# Patient Record
Sex: FEMALE | Race: WHITE | NOT HISPANIC OR LATINO
[De-identification: names, ages, dates, MRNs, and addresses within clinical notes are randomized per-mention and may not be internally consistent; named-entity substitution may affect disease eponyms.]

---

## 2020-08-21 ENCOUNTER — APPOINTMENT (OUTPATIENT)
Dept: OBGYN | Facility: CLINIC | Age: 45
End: 2020-08-21

## 2021-05-25 ENCOUNTER — APPOINTMENT (OUTPATIENT)
Dept: ANTEPARTUM | Facility: CLINIC | Age: 46
End: 2021-05-25
Payer: COMMERCIAL

## 2021-05-25 ENCOUNTER — APPOINTMENT (OUTPATIENT)
Dept: OBGYN | Facility: CLINIC | Age: 46
End: 2021-05-25
Payer: COMMERCIAL

## 2021-05-25 PROCEDURE — 58100 BIOPSY OF UTERUS LINING: CPT

## 2021-05-25 PROCEDURE — 99386 PREV VISIT NEW AGE 40-64: CPT

## 2021-05-25 PROCEDURE — 76830 TRANSVAGINAL US NON-OB: CPT

## 2021-05-25 PROCEDURE — 76857 US EXAM PELVIC LIMITED: CPT

## 2021-06-30 PROBLEM — Z00.00 ENCOUNTER FOR PREVENTIVE HEALTH EXAMINATION: Status: ACTIVE | Noted: 2021-06-30

## 2021-07-21 ENCOUNTER — EMERGENCY (EMERGENCY)
Facility: HOSPITAL | Age: 46
LOS: 1 days | Discharge: HOME | End: 2021-07-21
Attending: EMERGENCY MEDICINE
Payer: COMMERCIAL

## 2021-07-21 VITALS
TEMPERATURE: 98 F | WEIGHT: 175.05 LBS | RESPIRATION RATE: 16 BRPM | HEART RATE: 98 BPM | DIASTOLIC BLOOD PRESSURE: 84 MMHG | HEIGHT: 62 IN | SYSTOLIC BLOOD PRESSURE: 148 MMHG | OXYGEN SATURATION: 98 %

## 2021-07-21 LAB
HCG UR QL: NEGATIVE — SIGNIFICANT CHANGE UP
SARS-COV-2 RNA SPEC QL NAA+PROBE: SIGNIFICANT CHANGE UP

## 2021-07-21 PROCEDURE — 99284 EMERGENCY DEPT VISIT MOD MDM: CPT

## 2021-07-21 PROCEDURE — 87635 SARS-COV-2 COVID-19 AMP PRB: CPT

## 2021-07-21 PROCEDURE — 99283 EMERGENCY DEPT VISIT LOW MDM: CPT | Mod: 25

## 2021-07-21 PROCEDURE — 71045 X-RAY EXAM CHEST 1 VIEW: CPT

## 2021-07-21 PROCEDURE — 71045 X-RAY EXAM CHEST 1 VIEW: CPT | Mod: 26

## 2021-07-21 PROCEDURE — 81025 URINE PREGNANCY TEST: CPT

## 2021-07-21 NOTE — ED PROVIDER NOTE - PATIENT PORTAL LINK FT
You can access the FollowMyHealth Patient Portal offered by [facility] by registering at the following website: [email or web address]. By joining [employer]’s FollowMyHealth portal, you will also be able to view your health information using other applications (apps) compatible with our system.

## 2021-07-21 NOTE — ED PROVIDER NOTE - OBJECTIVE STATEMENT
Hong Konger : [id number]  45 y/o F patient with a significant PMHx of HTN, HLD, asthma presents to the ED with c/o cough x several days. Patient reports requiring albuterol inhaler cause of the shortness of breath. Patient states she went to her PMD and was told to schedule out patient CXR to r/o PNA and other infection. Patient reports that she decided to come in to the ED cause it would be easier to come to the ER and then making an appointment outpatient. No fever, chills, nausea and vomiting, or any other complaints. No shortness of breath right now. No sick contacts.

## 2021-07-21 NOTE — ED PROVIDER NOTE - CLINICAL SUMMARY MEDICAL DECISION MAKING FREE TEXT BOX
Pt w/ aforementioned presentation found to have negative CXR and negative covid. Pt w/ no wheezing or respiratory distress on exam. symptoms likely triggered by virus or current poor air quality advisory due [place]. Pt counseled and dc home

## 2021-07-21 NOTE — ED PROVIDER NOTE - NSFOLLOWUPINSTRUCTIONS_ED_ALL_ED_FT
Cough, Adult      A cough helps to clear your throat and lungs. A cough may be a sign of an illness or another medical condition.    An acute cough may only last 2–3 weeks, while a chronic cough may last 8 or more weeks.  Many things can cause a cough. They include:  •Germs (viruses or bacteria) that attack the airway.      •Breathing in things that bother (irritate) your lungs.      •Allergies.      •Asthma.      •Mucus that runs down the back of your throat (postnasal drip).      •Smoking.      •Acid backing up from the stomach into the tube that moves food from the mouth to the stomach (gastroesophageal reflux).      •Some medicines.      •Lung problems.      •Other medical conditions, such as heart failure or a blood clot in the lung (pulmonary embolism).        Follow these instructions at home:    Medicines     •Take over-the-counter and prescription medicines only as told by your doctor.      •Talk with your doctor before you take medicines that stop a cough (cough suppressants).        Lifestyle      • Do not smoke, and try not to be around smoke. Do not use any products that contain nicotine or tobacco, such as cigarettes, e-cigarettes, and chewing tobacco. If you need help quitting, ask your doctor.      •Drink enough fluid to keep your pee (urine) pale yellow.      •Avoid caffeine.      • Do not drink alcohol if your doctor tells you not to drink.        General instructions      •Watch for any changes in your cough. Tell your doctor about them.      •Always cover your mouth when you cough.      •Stay away from things that make you cough, such as perfume, candles, campfire smoke, or cleaning products.      •If the air is dry, use a cool mist vaporizer or humidifier in your home.      •If your cough is worse at night, try using extra pillows to raise your head up higher while you sleep.      •Rest as needed.      •Keep all follow-up visits as told by your doctor. This is important.        Contact a doctor if:    •You have new symptoms.      •You cough up pus.      •Your cough does not get better after 2–3 weeks, or your cough gets worse.      •Cough medicine does not help your cough and you are not sleeping well.      •You have pain that gets worse or pain that is not helped with medicine.      •You have a fever.      •You are losing weight and you do not know why.      •You have night sweats.        Get help right away if:    •You cough up blood.      •You have trouble breathing.      •Your heartbeat is very fast.      These symptoms may be an emergency. Do not wait to see if the symptoms will go away. Get medical help right away. Call your local emergency services (911 in the [place] Do not drive yourself to the hospital.       Summary    •A cough helps to clear your throat and lungs. Many things can cause a cough.      •Take over-the-counter and prescription medicines only as told by your doctor.      •Always cover your mouth when you cough.      •Contact a doctor if you have new symptoms or you have a cough that does not get better or gets worse.      This information is not intended to replace advice given to you by your health care provider. Make sure you discuss any questions you have with your health care provider.      ?????? ? ????????    Cough, Adult      ?????? — ??? ???????, ??????? ??????? ???? ????? ? ??????????? ???? (??????????? ???????). ?????? ???????????? ?????????????? ?????? ? ???????? ??. ????????????? ???????????? — ??? ?????????, ?? ???? ?????? ?????????? ? ??????? ?????????? ??? ?????? ?????, ?? ?? ????? ???? ????????? ???????????, ??????? ?????????? ??????. ?????? ?????? ????? ??????? ?????? 2–3 ??????, ? ??????????? — 8 ?????? ??? ??????.  ??????? ????????? ????? ????????:  •????????????? ??????????? ??????????????? ??? ??????????.      •???????? ???????, ??????? ?????????? ???? ??????.      •????????.      •???????????? ?????.      •?????, ??????? ??????? ?? ?????? ?????? ?????? ????? (?????????? ?? ?????? ?????? ??????).      •???????.      •?????? ??????? ?? ??????? ? ??????? (??????????????????? ???????).      •????????? ????????????? ?????????.      •??????????? ????????? ?? ??????? ??????.      •?????? ???????????, ????? ??? ????????? ??????????????? ??? ??????? ????? ? ?????? (????????????? ???????? ???????).        ? ???????? ???????? ???????? ????? ????????????:    ????????????? ?????????     •?????????? ?????????????? ? ??????????? ????????????? ????????? ?????? ??? ??????? ????? ??????? ??????.      •????? ??? ??? ????????? ????????????? ???????? ?? ?????, ??????????????????? ? ????? ??????? ??????.        ????? ?????      •????????? ??????????? ????. ?? ???????????? ??????, ??? ???????? ??????? ??? ?????, ???????? ????????, ??????????? ???????? ? ??????????? ?????. ???? ??? ?????? ?? ???? ???????? ??? ????? ??????, ?????????? ? ?????? ???????? ?????.      •????? ?????????? ????????, ????? ???? ???? ?????????? ??????-??????? ?????.      •????????? ???????.      • ?? ????? ????????, ???? ??? ??????? ???? ???? ??? ????? ????????.        ????? ????????      •???????? ?????? ???????? ?? ????????? ? ?????. ????????? ? ??? ?????? ???????? ?????.      •?? ????? ????? ?????? ??????????? ???.      •????????? ????????, ??????? ?????????? ??? ???????, ????? ??? ????, ?????, ???????? ????????, ??? ?????? ??? ???????? ???.      •???? ?????? ?????, ?????????? ???????? ????????? ???? ??? ??????????? ??????? ? ????? ??????? ??? ? ???? ??? ??????????????? ?????????? ??????????? ???????.      •???? ????? ??? ?????? ???????????, ?????????? ????? ? ????-???????????? ?????????.      •????????? ?? ???? ?????????????.      •????????? ?? ??? ?????? ???????????? ?????????? ??? ??????? ????? ??????? ??????. ??? ?????.        ?????????? ? ???????? ?????, ???? ? ???:    •????????? ????? ????????.      •????????????? ????.      •?????? ?? ?????????? ????? ????? 2–3 ?????? ??? ???????????.      •?????? ?? ??????? ????? ?????? ??????????????? ????????????? ?????????? ? ??? ???????? ???.      •????, ??????? ????????????, ??? ????, ??????? ?? ??????? ????? ?????? ?????????????? ?????????.      •????????? ?????????.      •??????????? ???????????? ???????? ????.      •????????? ?????? ??????????.        ?????????? ?????????? ?? ???????, ????:    •?? ???????????? ????? ??? ?????.      •??? ?????? ??????.      •???????????? ?????? ??????????.      ??? ???????? ????? ????????? ?? ????????? ?????????, ????????? ???????? ?????????? ??????. ?? ?????, ???? ???????? ??????? ????. ?????????? ?????????? ?? ??????????? ???????. ????????? ? ??????? ?????? ?????????? ?????? (? ??? ?? ?????? 911). ?? ??????? ?????????????? ????? ? ???????? ?? ??????????.       ??????    •?????? — ??? ???????, ??????? ??????? ???? ????? ? ??????????? ????. ????????????? ???????????? — ??? ?????????, ?? ???? ?????? ?????????? ? ??????? ?????????? ??? ?????? ?????, ?? ?? ????? ???? ????????? ???????????, ??????? ?????????? ??????.      •?????????? ?????????????? ? ??????????? ????????????? ????????? ?????? ??? ??????? ????? ??????? ??????.      •?? ????? ????? ?????? ??????????? ???.      •?????????? ? ???????? ?????, ???? ? ??? ????????? ????? ???????? ??? ???? ?????? ?? ???????? ????? 2–3 ?????? ??? ???????????.      ??? ?????????? ?? ????? ???????? ??????, ??????????????? ????? ??????. ??????????? ???????? ????? ???????????? ??? ??????? ? ????? ??????? ??????.

## 2021-07-21 NOTE — ED ADULT NURSE NOTE - NSIMPLEMENTINTERV_GEN_ALL_ED
Implemented All Universal Safety Interventions:  [place] to call system. Call bell, personal items and telephone within reach. Instruct patient to call for assistance. Room bathroom lighting operational. Non-slip footwear when patient is off stretcher. Physically safe environment: no spills, clutter or unnecessary equipment. Stretcher in lowest position, wheels locked, appropriate side rails in place.

## 2021-07-21 NOTE — ED PROVIDER NOTE - PHYSICAL EXAMINATION
General: pt lying in stretcher, appears stated age and is not in distress  HEENT: AT/NC, pink conjunctiva, anicteric sclerae, EOMI, PERRLA, TMs smooth, grey, intact, with normal landmarks, nasal mucosa pink, no discharge, turbinates not enlarged; moist mucus membranes, tongue well-papillated, good dentition; posterior pharynx shows no erythema or exudates;   Neck: supple, full ROM, trachea midline, no JVD, no cervical LAD, no midline ttp or stepoffs  Lungs: symmetric excursion, b/l clear vesicular breath sounds with no wheezes, crackles, or rhonchi  Heart: rrr, S1, S2 normal; no S3 or S4; no murmurs or rubs  Abd: normal bowel sounds; soft, nontender; negative McBurney's point tenderness, negative Gardiner's sign, no rebound, no guarding, spleen non-palpable; no hepatomegaly, no masses  Back: no midline spinal tenderness or stepoffs, no costovertebral angle tenderness  Extremities: no clubbing, cyanosis, or edema; no palpable deformities or fractures  Skin: good turgor; no rashes, petechiae, ecchymoses, or jaundice  Pulses: radial, posterior tibialis (PT), dorsalis pedis (DP) all 2+ & symmetric  Neuro: awake, alert, responsive; oriented to person, place and time; cranial nerves intact, EOMI, intact jaw movement, intact facial sensation, no facial asymmetry, hearing intact; no nystagmus, tongue midline; Motor: Normal tone in upper and lower extremities bilaterally strength 5/5; Sensory: intact to pinprick and light touch; Cerebellar: finger-to-nose intact; normal steady gait; negative Romberg’s sign; DTR: biceps, triceps, patellar, 2+, no pronator drift General: pt lying in stretcher, appears stated age and is not in distress  HEENT: AT/NC, pink conjunctiva, anicteric sclerae, EOMI, mmm  Neck: supple, full ROM, trachea midline, no JVD, no cervical LAD, no midline ttp or stepoffs  Lungs: symmetric excursion, b/l clear vesicular breath sounds with no wheezes, crackles, or rhonchi  Heart: rrr, S1, S2 normal; no S3 or S4; no murmurs or rubs  Abd: normal bowel sounds; soft, nontender; negative McBurney's point tenderness, negative Gardiner's sign, no rebound, no guarding, spleen non-palpable; no hepatomegaly, no masses  Back: no midline spinal tenderness or stepoffs, no costovertebral angle tenderness  Extremities: no clubbing, cyanosis, or edema; no palpable deformities or fractures  Skin: good turgor; no rashes, petechiae, ecchymoses, or jaundice  Pulses: radial, posterior tibialis (PT), dorsalis pedis (DP) all 2+ & symmetric  Neuro: awake, alert, responsive; oriented to person, place and time; cranial nerves intact, EOMI, intact jaw movement, intact facial sensation, no facial asymmetry, hearing intact; Motor: Normal tone in upper and lower extremities bilaterally; Sensory: intact ; normal steady gait;

## 2021-10-14 ENCOUNTER — APPOINTMENT (OUTPATIENT)
Dept: ANTEPARTUM | Facility: CLINIC | Age: 46
End: 2021-10-14

## 2021-10-14 PROBLEM — E78.5 HYPERLIPIDEMIA, UNSPECIFIED: Chronic | Status: ACTIVE | Noted: 2021-07-21

## 2021-10-14 PROBLEM — I10 ESSENTIAL (PRIMARY) HYPERTENSION: Chronic | Status: ACTIVE | Noted: 2021-07-21

## 2021-10-14 PROBLEM — J45.909 UNSPECIFIED ASTHMA, UNCOMPLICATED: Chronic | Status: ACTIVE | Noted: 2021-07-21

## 2021-11-18 ENCOUNTER — APPOINTMENT (OUTPATIENT)
Dept: ANTEPARTUM | Facility: CLINIC | Age: 46
End: 2021-11-18
Payer: COMMERCIAL

## 2021-11-18 ENCOUNTER — APPOINTMENT (OUTPATIENT)
Dept: OBGYN | Facility: CLINIC | Age: 46
End: 2021-11-18
Payer: COMMERCIAL

## 2021-11-18 VITALS — SYSTOLIC BLOOD PRESSURE: 126 MMHG | DIASTOLIC BLOOD PRESSURE: 83 MMHG | WEIGHT: 177 LBS

## 2021-11-18 DIAGNOSIS — N92.0 EXCESSIVE AND FREQUENT MENSTRUATION WITH REGULAR CYCLE: ICD-10-CM

## 2021-11-18 PROCEDURE — 76376 3D RENDER W/INTRP POSTPROCES: CPT

## 2021-11-18 PROCEDURE — 76856 US EXAM PELVIC COMPLETE: CPT

## 2021-11-18 PROCEDURE — 76830 TRANSVAGINAL US NON-OB: CPT

## 2021-11-19 LAB
BASOPHILS # BLD AUTO: 0.04 K/UL
BASOPHILS NFR BLD AUTO: 0.5 %
EOSINOPHIL # BLD AUTO: 0.15 K/UL
EOSINOPHIL NFR BLD AUTO: 1.9 %
HCT VFR BLD CALC: 41.3 %
HGB BLD-MCNC: 13.1 G/DL
IMM GRANULOCYTES NFR BLD AUTO: 0.3 %
LYMPHOCYTES # BLD AUTO: 2.62 K/UL
LYMPHOCYTES NFR BLD AUTO: 33.2 %
MAN DIFF?: NORMAL
MCHC RBC-ENTMCNC: 28.7 PG
MCHC RBC-ENTMCNC: 31.7 GM/DL
MCV RBC AUTO: 90.4 FL
MONOCYTES # BLD AUTO: 0.44 K/UL
MONOCYTES NFR BLD AUTO: 5.6 %
NEUTROPHILS # BLD AUTO: 4.61 K/UL
NEUTROPHILS NFR BLD AUTO: 58.5 %
PLATELET # BLD AUTO: 279 K/UL
RBC # BLD: 4.57 M/UL
RBC # FLD: 13.2 %
WBC # FLD AUTO: 7.88 K/UL

## 2022-12-08 ENCOUNTER — EMERGENCY (EMERGENCY)
Facility: HOSPITAL | Age: 47
LOS: 1 days | Discharge: HOME | End: 2022-12-08
Attending: EMERGENCY MEDICINE
Payer: MEDICAID

## 2022-12-08 VITALS
OXYGEN SATURATION: 98 % | DIASTOLIC BLOOD PRESSURE: 86 MMHG | SYSTOLIC BLOOD PRESSURE: 132 MMHG | WEIGHT: 167.55 LBS | HEART RATE: 115 BPM | TEMPERATURE: 101 F | RESPIRATION RATE: 17 BRPM | HEIGHT: 66 IN

## 2022-12-08 LAB
APPEARANCE UR: CLEAR — SIGNIFICANT CHANGE UP
BILIRUB UR-MCNC: NEGATIVE — SIGNIFICANT CHANGE UP
COLOR SPEC: SIGNIFICANT CHANGE UP
DIFF PNL FLD: ABNORMAL
GAS PNL BLDV: SIGNIFICANT CHANGE UP
GLUCOSE UR QL: NEGATIVE — SIGNIFICANT CHANGE UP
HCT VFR BLD CALC: 38.9 % — SIGNIFICANT CHANGE UP (ref 34.5–45)
HGB BLD-MCNC: 12.8 G/DL — SIGNIFICANT CHANGE UP (ref 11.5–15.5)
KETONES UR-MCNC: ABNORMAL
LEUKOCYTE ESTERASE UR-ACNC: NEGATIVE — SIGNIFICANT CHANGE UP
MCHC RBC-ENTMCNC: 27.5 PG — SIGNIFICANT CHANGE UP (ref 27–34)
MCHC RBC-ENTMCNC: 32.9 GM/DL — SIGNIFICANT CHANGE UP (ref 32–36)
MCV RBC AUTO: 83.7 FL — SIGNIFICANT CHANGE UP (ref 80–100)
NITRITE UR-MCNC: NEGATIVE — SIGNIFICANT CHANGE UP
PH UR: 6.5 — SIGNIFICANT CHANGE UP (ref 5–8)
PLATELET # BLD AUTO: 266 K/UL — SIGNIFICANT CHANGE UP (ref 150–400)
PROT UR-MCNC: ABNORMAL
RBC # BLD: 4.65 M/UL — SIGNIFICANT CHANGE UP (ref 3.8–5.2)
RBC # FLD: 13.1 % — SIGNIFICANT CHANGE UP (ref 10.3–14.5)
SP GR SPEC: 1.02 — SIGNIFICANT CHANGE UP (ref 1.01–1.02)
UROBILINOGEN FLD QL: NEGATIVE — SIGNIFICANT CHANGE UP
WBC # BLD: 6.85 K/UL — SIGNIFICANT CHANGE UP (ref 3.8–10.5)
WBC # FLD AUTO: 6.85 K/UL — SIGNIFICANT CHANGE UP (ref 3.8–10.5)

## 2022-12-08 PROCEDURE — 93010 ELECTROCARDIOGRAM REPORT: CPT

## 2022-12-08 PROCEDURE — 99285 EMERGENCY DEPT VISIT HI MDM: CPT

## 2022-12-08 RX ORDER — SODIUM CHLORIDE 9 MG/ML
1000 INJECTION INTRAMUSCULAR; INTRAVENOUS; SUBCUTANEOUS ONCE
Refills: 0 | Status: COMPLETED | OUTPATIENT
Start: 2022-12-08 | End: 2022-12-08

## 2022-12-08 RX ORDER — ONDANSETRON 8 MG/1
4 TABLET, FILM COATED ORAL ONCE
Refills: 0 | Status: COMPLETED | OUTPATIENT
Start: 2022-12-08 | End: 2022-12-08

## 2022-12-08 RX ORDER — ACETAMINOPHEN 500 MG
1000 TABLET ORAL ONCE
Refills: 0 | Status: COMPLETED | OUTPATIENT
Start: 2022-12-08 | End: 2022-12-08

## 2022-12-08 RX ADMIN — SODIUM CHLORIDE 1000 MILLILITER(S): 9 INJECTION INTRAMUSCULAR; INTRAVENOUS; SUBCUTANEOUS at 23:32

## 2022-12-08 RX ADMIN — Medication 400 MILLIGRAM(S): at 23:32

## 2022-12-08 RX ADMIN — ONDANSETRON 4 MILLIGRAM(S): 8 TABLET, FILM COATED ORAL at 23:32

## 2022-12-08 NOTE — ED PROVIDER NOTE - OBJECTIVE STATEMENT
48yo F with no PMH presents for eval of CP/abd pain & fever. Pt states she's been feeling bad for last 2-3 weeks but in last 5d started with a cough than developed fever, nausea/vomiting and worsening epigastric pain radiating up along posterior sternum and mild SOB. Went to [facility] where she and her son state they 'didn't really do anything' and dc'd her from ED. Patient feels worsenign abd pain that she feels is radiating to 'liver'. Not really tolerating PO, no urinary sxs, no rashes. CP is worse w/ movement and leaning foward. Not on meds, last travel to [facility], no known sick contacts. Fully vaccinated.

## 2022-12-08 NOTE — ED ADULT TRIAGE NOTE - CHIEF COMPLAINT QUOTE
pt c/o difficulty breathing, cp x 2 days; pt also endorsing n/v and fevers x 4 days; pt reports h/o asthma and reports no relief with inhaler

## 2022-12-08 NOTE — ED PROVIDER NOTE - PROGRESS NOTE DETAILS
Jona PGY2: +influenza, pt feels mildly improved but not totally resolved. Feels better after IVF. Discussed results of labs and CTS with family. Will dc min.e     Return precautions given with [facility]  [id number] [facility] - pt states she understands.  Has PCP appt at 9am 12/9. Jona PGY2: planned for dc, pt was able to tolerate PO water. However nearing dc, pt began vomiting again. Will repeat zofran and a 2nd L of IVF and hold dispo. Re-PO challenge before dispo. Attending note (Jude): results reviewed with ED resident; agree with above plan, if tolerating po, will be stable for dc. Jona PGY2: tolerated PO w/o vomiting. Dc home with family.

## 2022-12-08 NOTE — ED PROVIDER NOTE - PATIENT PORTAL LINK FT
You can access the FollowMyHealth Patient Portal offered by [facility] by registering at the following website: [email or web address]. By joining [employer]’s FollowMyHealth portal, you will also be able to view your health information using other applications (apps) compatible with our system. You can access the FollowMyHealth Patient Portal offered by [facility] by registering at the following website: [email or web address]. By joining [employer]’s FollowMyHealth portal, you will also be able to view your health information using other applications (apps) compatible with our system.

## 2022-12-08 NOTE — ED PROVIDER NOTE - CLINICAL SUMMARY MEDICAL DECISION MAKING FREE TEXT BOX
Jona PGY2: 48yo F with no PMH presents for 5d of SOB, cough, fever, abd pain with NV. May represent viral synd vs gall bladder disease vs PNA vs COVID-19 vs less likely PE but cannot r/o. EKG no acute ischemic changes. Will plan for labs, CT chest/abd/pelvis to assess for PE and pathology. Culture, RVP, UA for infectious w/u. Control fever, nausea & hydrate.

## 2022-12-08 NOTE — ED PROVIDER NOTE - SHIFT CHANGE DETAILS
Carlos Eduardo Ceja MD [facility] note of transfer at the usual time of sign out: Receiving team will follow up on labs, analgesia, any clinical imaging, reassess and disposition as clinically indicated.  Details of patient and plan conveyed to receiving physician and conveyed back for understanding.  There were no questions at this time about the patient's status, disposition, and plan. Patient's care to be taken over by receiving physician at this time, all decisions regarding the progression of care will be made at their discretion.

## 2022-12-08 NOTE — ED PROVIDER NOTE - ATTENDING CONTRIBUTION TO CARE
47 no past medical history, feeling unwell x 3 weeks and 5 days of cough followed by n/v/ and fevers with intermittent shortness of breath and epigastric pain. Worse sitting up and forward. patient presented to outside hospital without gross pathological findings. now here with persistent pain in the abdomen and right upper quadrant discomfort. + nauseated and febrile  + travel to [facility]   will get iv, cbc, cmp, analgesia and antiemetic prn, cta chest if and follow through to abdomen and pelvis  Will follow up on labs, analgesia, imaging, reassess and disposition as clinically indicated.  *The above represents an initial assessment/impression. Please refer to my progress notes below for potential changes in patient clinical course*

## 2022-12-08 NOTE — ED PROVIDER NOTE - NSFOLLOWUPINSTRUCTIONS_ED_ALL_ED_FT
Influenza / Flu    - Hydrate well as tolerated  - Rest   - Control fever with Tylenol or Motrin as needed  - Return if your symptoms worsen, unable to eat or drink, or other new symptoms appear.    - Follow up with primary care doctor  --------------------------------------------------------------------------------------------------------------    ????? / ?????    - ?????????? ??????, ??? ???????????  - ????????  - ????????????? ????????? ?????????? ??? ???????? ?? ???? ?????????????.  - ?????????, ???? ???? ???????? ?????????, ?? ?? ??????? ???? ??? ???? ??? ???????? ?????? ????? ????????.    - ??????????? ?????????? ? ????? ????????? ??????-?????????? ?????? Influenza / Flu    - Hydrate well as tolerated  - Rest   - Control fever with Tylenol or Motrin as needed  - Return if your symptoms worsen, unable to eat or drink, or other new symptoms appear.    - Follow up with primary care doctor

## 2022-12-08 NOTE — ED ADULT NURSE NOTE - OBJECTIVE STATEMENT
47y F with PMHx of presents to the ED for cp and sob x 4days. Pt is Indian speaking, son at the bedside reports pt began coughing about 4 days ago, and cp developed after, pt feels worse cp when she coughs, went to [facility] yesterday and was discharged and does not know diagnosis, feels like "they were not helpful." Pt is able to speak in full sentences on RA. On CM, Sinus tach. Pt safety and comfort measures provided.

## 2022-12-08 NOTE — ED PROVIDER NOTE - PHYSICAL EXAMINATION
CONSTITUTIONAL: fatigued, mild-mod distress, tearful  SKIN: dry  HEAD: NCAT  EYES: NL inspection, no icterus   ENT: dry oral mucosa  NECK: Supple; non tender.  CARD: tachycardia, regular, tender chest wall muscles   RESP: lungs clear w/o crackles/rhonchi  ABD: generally tender but inc in RUQ/epigastric region  EXT: no pedal edema; some inc R calf swelling w/o erythema   NEURO: Grossly unremarkable  PSYCH: tearful

## 2022-12-09 VITALS
RESPIRATION RATE: 17 BRPM | DIASTOLIC BLOOD PRESSURE: 70 MMHG | SYSTOLIC BLOOD PRESSURE: 112 MMHG | HEART RATE: 102 BPM | OXYGEN SATURATION: 98 %

## 2022-12-09 LAB
ALBUMIN SERPL ELPH-MCNC: 4.1 G/DL — SIGNIFICANT CHANGE UP (ref 3.3–5)
ALP SERPL-CCNC: 64 U/L — SIGNIFICANT CHANGE UP (ref 40–120)
ALT FLD-CCNC: 12 U/L — SIGNIFICANT CHANGE UP (ref 10–45)
ANION GAP SERPL CALC-SCNC: 16 MMOL/L — SIGNIFICANT CHANGE UP (ref 5–17)
APTT BLD: 30.3 SEC — SIGNIFICANT CHANGE UP (ref 27.5–35.5)
AST SERPL-CCNC: 14 U/L — SIGNIFICANT CHANGE UP (ref 10–40)
BASOPHILS # BLD AUTO: 0 K/UL — SIGNIFICANT CHANGE UP (ref 0–0.2)
BASOPHILS NFR BLD AUTO: 0 % — SIGNIFICANT CHANGE UP (ref 0–2)
BILIRUB SERPL-MCNC: 0.3 MG/DL — SIGNIFICANT CHANGE UP (ref 0.2–1.2)
BUN SERPL-MCNC: 11 MG/DL — SIGNIFICANT CHANGE UP (ref 7–23)
CALCIUM SERPL-MCNC: 8.9 MG/DL — SIGNIFICANT CHANGE UP (ref 8.4–10.5)
CHLORIDE SERPL-SCNC: 101 MMOL/L — SIGNIFICANT CHANGE UP (ref 96–108)
CO2 SERPL-SCNC: 19 MMOL/L — LOW (ref 22–31)
CREAT SERPL-MCNC: 0.61 MG/DL — SIGNIFICANT CHANGE UP (ref 0.5–1.3)
EGFR: 112 ML/MIN/1.73M2 — SIGNIFICANT CHANGE UP
EOSINOPHIL # BLD AUTO: 0.06 K/UL — SIGNIFICANT CHANGE UP (ref 0–0.5)
EOSINOPHIL NFR BLD AUTO: 0.9 % — SIGNIFICANT CHANGE UP (ref 0–6)
FLUAV H1 2009 PAND RNA SPEC QL NAA+PROBE: DETECTED
GLUCOSE SERPL-MCNC: 106 MG/DL — HIGH (ref 70–99)
INR BLD: 1.28 RATIO — HIGH (ref 0.88–1.16)
LIDOCAIN IGE QN: 54 U/L — SIGNIFICANT CHANGE UP (ref 7–60)
LYMPHOCYTES # BLD AUTO: 0.48 K/UL — LOW (ref 1–3.3)
LYMPHOCYTES # BLD AUTO: 7 % — LOW (ref 13–44)
MAGNESIUM SERPL-MCNC: 1.7 MG/DL — SIGNIFICANT CHANGE UP (ref 1.6–2.6)
MONOCYTES # BLD AUTO: 0.18 K/UL — SIGNIFICANT CHANGE UP (ref 0–0.9)
MONOCYTES NFR BLD AUTO: 2.6 % — SIGNIFICANT CHANGE UP (ref 2–14)
NEUTROPHILS # BLD AUTO: 6.13 K/UL — SIGNIFICANT CHANGE UP (ref 1.8–7.4)
NEUTROPHILS NFR BLD AUTO: 84.3 % — HIGH (ref 43–77)
NT-PROBNP SERPL-SCNC: 83 PG/ML — SIGNIFICANT CHANGE UP (ref 0–300)
POTASSIUM SERPL-MCNC: 3.5 MMOL/L — SIGNIFICANT CHANGE UP (ref 3.5–5.3)
POTASSIUM SERPL-SCNC: 3.5 MMOL/L — SIGNIFICANT CHANGE UP (ref 3.5–5.3)
PROT SERPL-MCNC: 7.9 G/DL — SIGNIFICANT CHANGE UP (ref 6–8.3)
PROTHROM AB SERPL-ACNC: 14.8 SEC — HIGH (ref 10.5–13.4)
RAPID RVP RESULT: DETECTED
SARS-COV-2 RNA SPEC QL NAA+PROBE: SIGNIFICANT CHANGE UP
SODIUM SERPL-SCNC: 136 MMOL/L — SIGNIFICANT CHANGE UP (ref 135–145)
TROPONIN T, HIGH SENSITIVITY RESULT: <6 NG/L — SIGNIFICANT CHANGE UP (ref 0–51)

## 2022-12-09 PROCEDURE — 84132 ASSAY OF SERUM POTASSIUM: CPT

## 2022-12-09 PROCEDURE — 84295 ASSAY OF SERUM SODIUM: CPT

## 2022-12-09 PROCEDURE — 80053 COMPREHEN METABOLIC PANEL: CPT

## 2022-12-09 PROCEDURE — 74174 CTA ABD&PLVS W/CONTRAST: CPT | Mod: MA

## 2022-12-09 PROCEDURE — 36415 COLL VENOUS BLD VENIPUNCTURE: CPT

## 2022-12-09 PROCEDURE — 96375 TX/PRO/DX INJ NEW DRUG ADDON: CPT | Mod: XU

## 2022-12-09 PROCEDURE — 0225U NFCT DS DNA&RNA 21 SARSCOV2: CPT

## 2022-12-09 PROCEDURE — 87086 URINE CULTURE/COLONY COUNT: CPT

## 2022-12-09 PROCEDURE — 82947 ASSAY GLUCOSE BLOOD QUANT: CPT

## 2022-12-09 PROCEDURE — 82565 ASSAY OF CREATININE: CPT

## 2022-12-09 PROCEDURE — 87040 BLOOD CULTURE FOR BACTERIA: CPT

## 2022-12-09 PROCEDURE — 71275 CT ANGIOGRAPHY CHEST: CPT | Mod: MA

## 2022-12-09 PROCEDURE — 83735 ASSAY OF MAGNESIUM: CPT

## 2022-12-09 PROCEDURE — 93005 ELECTROCARDIOGRAM TRACING: CPT

## 2022-12-09 PROCEDURE — 82435 ASSAY OF BLOOD CHLORIDE: CPT

## 2022-12-09 PROCEDURE — 96376 TX/PRO/DX INJ SAME DRUG ADON: CPT | Mod: XU

## 2022-12-09 PROCEDURE — 84484 ASSAY OF TROPONIN QUANT: CPT

## 2022-12-09 PROCEDURE — 81001 URINALYSIS AUTO W/SCOPE: CPT

## 2022-12-09 PROCEDURE — 85730 THROMBOPLASTIN TIME PARTIAL: CPT

## 2022-12-09 PROCEDURE — 85610 PROTHROMBIN TIME: CPT

## 2022-12-09 PROCEDURE — 82330 ASSAY OF CALCIUM: CPT

## 2022-12-09 PROCEDURE — 85025 COMPLETE CBC W/AUTO DIFF WBC: CPT

## 2022-12-09 PROCEDURE — 83690 ASSAY OF LIPASE: CPT

## 2022-12-09 PROCEDURE — 83605 ASSAY OF LACTIC ACID: CPT

## 2022-12-09 PROCEDURE — 82803 BLOOD GASES ANY COMBINATION: CPT

## 2022-12-09 PROCEDURE — 83880 ASSAY OF NATRIURETIC PEPTIDE: CPT

## 2022-12-09 PROCEDURE — 96374 THER/PROPH/DIAG INJ IV PUSH: CPT | Mod: XU

## 2022-12-09 PROCEDURE — 74174 CTA ABD&PLVS W/CONTRAST: CPT | Mod: 26,MA

## 2022-12-09 PROCEDURE — 85014 HEMATOCRIT: CPT

## 2022-12-09 PROCEDURE — 99285 EMERGENCY DEPT VISIT HI MDM: CPT | Mod: 25

## 2022-12-09 PROCEDURE — 71275 CT ANGIOGRAPHY CHEST: CPT | Mod: 26,MA

## 2022-12-09 PROCEDURE — 85018 HEMOGLOBIN: CPT

## 2022-12-09 RX ORDER — SODIUM CHLORIDE 9 MG/ML
1000 INJECTION INTRAMUSCULAR; INTRAVENOUS; SUBCUTANEOUS ONCE
Refills: 0 | Status: COMPLETED | OUTPATIENT
Start: 2022-12-09 | End: 2022-12-09

## 2022-12-09 RX ORDER — ONDANSETRON 8 MG/1
4 TABLET, FILM COATED ORAL ONCE
Refills: 0 | Status: COMPLETED | OUTPATIENT
Start: 2022-12-09 | End: 2022-12-09

## 2022-12-09 RX ADMIN — ONDANSETRON 4 MILLIGRAM(S): 8 TABLET, FILM COATED ORAL at 03:53

## 2022-12-09 RX ADMIN — SODIUM CHLORIDE 1000 MILLILITER(S): 9 INJECTION INTRAMUSCULAR; INTRAVENOUS; SUBCUTANEOUS at 03:53

## 2022-12-09 RX ADMIN — Medication 1000 MILLIGRAM(S): at 00:30

## 2022-12-09 NOTE — ED ADULT NURSE REASSESSMENT NOTE - NS ED NURSE REASSESS COMMENT FT1
Pt given food for PO challenge. Verbalized understanding to notify RN if she experiences N/V or abdominal pain.

## 2022-12-10 LAB
CULTURE RESULTS: SIGNIFICANT CHANGE UP
SPECIMEN SOURCE: SIGNIFICANT CHANGE UP

## 2022-12-14 LAB
CULTURE RESULTS: SIGNIFICANT CHANGE UP
SPECIMEN SOURCE: SIGNIFICANT CHANGE UP

## 2024-05-22 ENCOUNTER — EMERGENCY (EMERGENCY)
Facility: HOSPITAL | Age: 49
LOS: 1 days | Discharge: HOME | End: 2024-05-22
Attending: EMERGENCY MEDICINE
Payer: COMMERCIAL

## 2024-05-22 PROCEDURE — 99285 EMERGENCY DEPT VISIT HI MDM: CPT

## 2024-05-23 VITALS
TEMPERATURE: 98 F | OXYGEN SATURATION: 98 % | DIASTOLIC BLOOD PRESSURE: 78 MMHG | SYSTOLIC BLOOD PRESSURE: 116 MMHG | RESPIRATION RATE: 17 BRPM | HEART RATE: 80 BPM | WEIGHT: 175.05 LBS

## 2024-05-23 VITALS
TEMPERATURE: 98 F | SYSTOLIC BLOOD PRESSURE: 110 MMHG | HEART RATE: 64 BPM | DIASTOLIC BLOOD PRESSURE: 71 MMHG | OXYGEN SATURATION: 98 % | RESPIRATION RATE: 16 BRPM

## 2024-05-23 PROBLEM — Z78.9 OTHER SPECIFIED HEALTH STATUS: Chronic | Status: ACTIVE | Noted: 2022-12-09

## 2024-05-23 LAB
ALBUMIN SERPL ELPH-MCNC: 3.6 G/DL — SIGNIFICANT CHANGE UP (ref 3.5–5)
ALP SERPL-CCNC: 50 U/L — SIGNIFICANT CHANGE UP (ref 40–120)
ALT FLD-CCNC: 19 U/L DA — SIGNIFICANT CHANGE UP (ref 10–60)
ANION GAP SERPL CALC-SCNC: 3 MMOL/L — LOW (ref 5–17)
APPEARANCE UR: CLEAR — SIGNIFICANT CHANGE UP
AST SERPL-CCNC: 9 U/L — LOW (ref 10–40)
BILIRUB SERPL-MCNC: 0.2 MG/DL — SIGNIFICANT CHANGE UP (ref 0.2–1.2)
BILIRUB UR-MCNC: NEGATIVE — SIGNIFICANT CHANGE UP
BUN SERPL-MCNC: 15 MG/DL — SIGNIFICANT CHANGE UP (ref 7–18)
CALCIUM SERPL-MCNC: 9.2 MG/DL — SIGNIFICANT CHANGE UP (ref 8.4–10.5)
CHLORIDE SERPL-SCNC: 107 MMOL/L — SIGNIFICANT CHANGE UP (ref 96–108)
CO2 SERPL-SCNC: 28 MMOL/L — SIGNIFICANT CHANGE UP (ref 22–31)
COLOR SPEC: YELLOW — SIGNIFICANT CHANGE UP
CREAT SERPL-MCNC: 0.7 MG/DL — SIGNIFICANT CHANGE UP (ref 0.5–1.3)
DIFF PNL FLD: NEGATIVE — SIGNIFICANT CHANGE UP
EGFR: 107 ML/MIN/1.73M2 — SIGNIFICANT CHANGE UP
GLUCOSE SERPL-MCNC: 113 MG/DL — HIGH (ref 70–99)
GLUCOSE UR QL: NEGATIVE MG/DL — SIGNIFICANT CHANGE UP
HCT VFR BLD CALC: 40.1 % — SIGNIFICANT CHANGE UP (ref 34.5–45)
HGB BLD-MCNC: 13 G/DL — SIGNIFICANT CHANGE UP (ref 11.5–15.5)
HIV 1 & 2 AB SERPL IA.RAPID: SIGNIFICANT CHANGE UP
KETONES UR-MCNC: NEGATIVE MG/DL — SIGNIFICANT CHANGE UP
LEUKOCYTE ESTERASE UR-ACNC: NEGATIVE — SIGNIFICANT CHANGE UP
LIDOCAIN IGE QN: 49 U/L — SIGNIFICANT CHANGE UP (ref 13–75)
MCHC RBC-ENTMCNC: 27.9 PG — SIGNIFICANT CHANGE UP (ref 27–34)
MCHC RBC-ENTMCNC: 32.4 GM/DL — SIGNIFICANT CHANGE UP (ref 32–36)
MCV RBC AUTO: 86.1 FL — SIGNIFICANT CHANGE UP (ref 80–100)
NITRITE UR-MCNC: NEGATIVE — SIGNIFICANT CHANGE UP
NRBC # BLD: 0 /100 WBCS — SIGNIFICANT CHANGE UP (ref 0–0)
PH UR: 6.5 — SIGNIFICANT CHANGE UP (ref 5–8)
PLATELET # BLD AUTO: 236 K/UL — SIGNIFICANT CHANGE UP (ref 150–400)
POTASSIUM SERPL-MCNC: 4.1 MMOL/L — SIGNIFICANT CHANGE UP (ref 3.5–5.3)
POTASSIUM SERPL-SCNC: 4.1 MMOL/L — SIGNIFICANT CHANGE UP (ref 3.5–5.3)
PROT SERPL-MCNC: 7.3 G/DL — SIGNIFICANT CHANGE UP (ref 6–8.3)
PROT UR-MCNC: NEGATIVE MG/DL — SIGNIFICANT CHANGE UP
RBC # BLD: 4.66 M/UL — SIGNIFICANT CHANGE UP (ref 3.8–5.2)
RBC # FLD: 13.4 % — SIGNIFICANT CHANGE UP (ref 10.3–14.5)
SODIUM SERPL-SCNC: 138 MMOL/L — SIGNIFICANT CHANGE UP (ref 135–145)
SP GR SPEC: 1.01 — SIGNIFICANT CHANGE UP (ref 1–1.03)
TROPONIN I, HIGH SENSITIVITY RESULT: <3 NG/L — SIGNIFICANT CHANGE UP
UROBILINOGEN FLD QL: 0.2 MG/DL — SIGNIFICANT CHANGE UP (ref 0.2–1)
WBC # BLD: 8.83 K/UL — SIGNIFICANT CHANGE UP (ref 3.8–10.5)
WBC # FLD AUTO: 8.83 K/UL — SIGNIFICANT CHANGE UP (ref 3.8–10.5)

## 2024-05-23 PROCEDURE — 96374 THER/PROPH/DIAG INJ IV PUSH: CPT

## 2024-05-23 PROCEDURE — 83690 ASSAY OF LIPASE: CPT

## 2024-05-23 PROCEDURE — 84484 ASSAY OF TROPONIN QUANT: CPT

## 2024-05-23 PROCEDURE — 84702 CHORIONIC GONADOTROPIN TEST: CPT

## 2024-05-23 PROCEDURE — 80053 COMPREHEN METABOLIC PANEL: CPT

## 2024-05-23 PROCEDURE — 36415 COLL VENOUS BLD VENIPUNCTURE: CPT

## 2024-05-23 PROCEDURE — 74177 CT ABD & PELVIS W/CONTRAST: CPT | Mod: MC

## 2024-05-23 PROCEDURE — 99284 EMERGENCY DEPT VISIT MOD MDM: CPT | Mod: 25

## 2024-05-23 PROCEDURE — 74177 CT ABD & PELVIS W/CONTRAST: CPT | Mod: 26,MC

## 2024-05-23 PROCEDURE — 86703 HIV-1/HIV-2 1 RESULT ANTBDY: CPT

## 2024-05-23 PROCEDURE — 81003 URINALYSIS AUTO W/O SCOPE: CPT

## 2024-05-23 PROCEDURE — 96375 TX/PRO/DX INJ NEW DRUG ADDON: CPT

## 2024-05-23 PROCEDURE — 85027 COMPLETE CBC AUTOMATED: CPT

## 2024-05-23 RX ORDER — PANTOPRAZOLE SODIUM 20 MG/1
40 TABLET, DELAYED RELEASE ORAL ONCE
Refills: 0 | Status: COMPLETED | OUTPATIENT
Start: 2024-05-23 | End: 2024-05-23

## 2024-05-23 RX ORDER — ONDANSETRON 8 MG/1
4 TABLET, FILM COATED ORAL ONCE
Refills: 0 | Status: COMPLETED | OUTPATIENT
Start: 2024-05-23 | End: 2024-05-23

## 2024-05-23 RX ORDER — SODIUM CHLORIDE 9 MG/ML
1000 INJECTION, SOLUTION INTRAVENOUS ONCE
Refills: 0 | Status: COMPLETED | OUTPATIENT
Start: 2024-05-23 | End: 2024-05-23

## 2024-05-23 RX ADMIN — SODIUM CHLORIDE 1000 MILLILITER(S): 9 INJECTION, SOLUTION INTRAVENOUS at 02:09

## 2024-05-23 RX ADMIN — PANTOPRAZOLE SODIUM 40 MILLIGRAM(S): 20 TABLET, DELAYED RELEASE ORAL at 02:09

## 2024-05-23 RX ADMIN — ONDANSETRON 4 MILLIGRAM(S): 8 TABLET, FILM COATED ORAL at 02:09

## 2024-05-23 NOTE — ED PROVIDER NOTE - OBJECTIVE STATEMENT
49-year-old female  hypercholesterolemia presents with abdominal pain.  Patient states she has had 2 days of abdominal pain nausea and vomited twice.  She states the pain is intermittent but getting progressively worse.  She denies change in her bowel movements.  She did have a  in the past.

## 2024-05-23 NOTE — ED PROVIDER NOTE - NSFOLLOWUPINSTRUCTIONS_ED_ALL_ED_FT
Ruptured Ovarian Cyst    A cyst is a pocket of water on your ovaries.  These are very normal.  Sometimes they burst open causing a lot of pain.  This may never happen again.  If it happens again you can see your OB/GYN  and   They may be able to help decrease them from coming back.      I have sent naproxen to your pharmacy.  This is a cousin to ibuprofen.  Take it morning and night with and will help with the pain.    WHAT YOU NEED TO KNOW:    What is a ruptured ovarian cyst? A ruptured ovarian cyst is a cyst that breaks open. A cyst is a sac that grows on an ovary. This sac usually contains fluid, but may sometimes have blood or tissue in it. A large cyst that ruptures may lead to problems that need immediate care.    What causes or increases my risk for a ruptured ovarian cyst? You may be at higher risk for a ruptured ovarian cyst if you have polycystic ovarian syndrome (PCOS). PCOS causes many cysts to grow on your ovary. Any of the following can lead to a ruptured cyst:    Hormone changes around the time of your monthly period    Pressure on the cyst from sports, sex, or an injury to the area (usually large cysts)    Pregnancy  What are the signs and symptoms of a ruptured ovarian cyst? You may have no signs or symptoms, or you may have any of the following:    Pain that can range from mild to severe or be mild at first but become severe quickly    Sudden, sharp, or stabbing pain that happens on one side    Pain that starts during activity or sex, or that gets worse when you move    Tenderness in the area of your ovary    A low fever    Nausea, vomiting, or dizziness  How is a ruptured ovarian cyst diagnosed? Your healthcare provider will examine you and ask about your symptoms. If you have pain, tell your provider what you were doing when you first felt the pain. Include anything that helps or increases the pain. Tell your provider if you or anyone in your family has a history of breast or ovarian cancer, or PCOS. You may need any of the following:    Blood tests are used to check for pregnancy or an ectopic pregnancy. This is when a fertilized egg is growing in a fallopian tube instead of the womb. You may also need to have hormone levels checked. Blood tests may also be used to check for signs of an infection or tumor.    Ultrasound pictures may show a cyst on your ovary. For this test, an ultrasound wand is inserted into your vagina and guided up toward your uterus. This helps your healthcare provider get a closer look at your ovaries.  How is a ruptured ovarian cyst treated? Treatment depends on your age, the size of the cyst, and if it caused problems that need treatment. Treatment may not be needed if the cyst was small or your body absorbed the fluid that came out of the cyst when it ruptured. You may need any of the following:    NSAIDs, such as ibuprofen, help decrease swelling, pain, and fever. This medicine is available with or without a doctor's order. NSAIDs can cause stomach bleeding or kidney problems in certain people. If you take blood thinner medicine, always ask if NSAIDs are safe for you. Always read the medicine label and follow directions. Do not give these medicines to children younger than 6 months without direction from a healthcare provider.    Prescription pain medicine may be given. Ask your healthcare provider how to take this medicine safely. Some prescription pain medicines contain acetaminophen. Do not take other medicines that contain acetaminophen without talking to your healthcare provider. Too much acetaminophen may cause liver damage. Prescription pain medicine may cause constipation. Ask your healthcare provider how to prevent or treat constipation.    Antibiotics may be needed to prevent or fight an infection caused by bacteria.    Surgery may be needed to remove fluid or blood in the area of the ruptured cyst. The outside of the ruptured cyst may also need to be removed.  What can I do to manage or prevent a ruptured ovarian cyst?    Apply heat where you have pain, as directed. Heat can help relieve mild pain. Use a heating pad (set on low) or hot water bottle. Wrap the pad or bottle in a towel before you apply it to your skin. Apply heat for 20 minutes every hour, or as directed. A warm bath may also help relieve the pain.    Ask when to come in for a follow-up examination. You may need another ultrasound 6 weeks after your cyst was treated. This will help make sure the cyst is no longer growing or causing health problems. You may also need ultrasound tests for 2 or 3 monthly periods to see how hormones affect your ovaries.    Ask about birth control pills. These may help reduce your risk for cysts. Ask your healthcare provider if birth control pills are right for you. The risk for a blood clot is higher if you take birth control pills, especially if you are older than 35 or smoke.    Have a pelvic exam every year. This may also be called a well woman visit. The exam will include a Pap smear to check for certain cancers. Your healthcare provider will also press on your abdomen to check for lumps or other problems. A pelvic exam can help find problems early. This makes treatment easier and more effective. Tell your healthcare provider if you notice any changes in your monthly periods. Examples include periods that start on a different day than usual, or are lighter or heavier than usual. Tell your provider if you have worse pain than usual, or if the pain is different than you had before.  Call 911 for any of the following:    You are too weak or dizzy to stand up.    When should I seek immediate care?    You have severe pain in your pelvis or in your abdomen.    You have pain along with a fever, nausea, or vomiting.    You have signs of shock from blood loss, such as dizziness, cold or clammy skin, or fast breathing.  When should I contact my healthcare provider?    You notice changes in your monthly periods, or you begin to have nausea or vomiting with your periods.    You have new or worsening symptoms.    Your pain does not get better with pain medicine.    You have pain during sex.    You have bleeding from your vagina that is not your period.    Your abdomen is swollen, or you have a full or heavy feeling in your lower abdomen.    You have trouble urinating.    You have questions or concerns about your condition or care.  CARE AGREEMENT:    You have the right to help plan your care. Learn about your health condition and how it may be treated. Discuss treatment options with your healthcare providers to decide what care you want to receive. You always have the right to refuse treatment.

## 2024-05-23 NOTE — ED PROVIDER NOTE - PATIENT PORTAL LINK FT
You can access the FollowMyHealth Patient Portal offered by [facility] by registering at the following website: [email or web address]. By joining [facility]’s FollowMyHealth portal, you will also be able to view your health information using other applications (apps) compatible with our system.

## 2024-05-23 NOTE — ED PROVIDER NOTE - PHYSICAL EXAMINATION
General: Well appearing, no acute distress, appears stated age  HEENT: normocephalic, atraumatic   Respiratory: normal work of breathing  MSK: no swelling or tenderness of lower extremities, moving all extremities spontaneously   Skin: warm, dry  Neuro: A&Ox3, cranial nerves II-XII intact, 5/5 strength in all extremities, no sensory deficits, normal gait   Psych: appropriate affect  ABD: soft, LLQ and suprapubic tender, nondistended, no guarding, no rebound, no CVA tenderness

## 2024-12-30 NOTE — ED PROVIDER NOTE - NS ED ROS FT
LOV 10/30/2024  NOV Not scheduled yet    Return in about 3 months (around 1/30/2025) for Annual physical, fasting after 1/30/2025.        see HPI ROS as above

## 2025-05-12 ENCOUNTER — EMERGENCY (EMERGENCY)
Facility: HOSPITAL | Age: 50
LOS: 1 days | End: 2025-05-12
Attending: EMERGENCY MEDICINE
Payer: COMMERCIAL

## 2025-05-12 VITALS
SYSTOLIC BLOOD PRESSURE: 112 MMHG | TEMPERATURE: 97 F | DIASTOLIC BLOOD PRESSURE: 77 MMHG | HEART RATE: 69 BPM | OXYGEN SATURATION: 99 % | RESPIRATION RATE: 18 BRPM

## 2025-05-12 VITALS
HEART RATE: 78 BPM | RESPIRATION RATE: 18 BRPM | OXYGEN SATURATION: 100 % | WEIGHT: 176.37 LBS | SYSTOLIC BLOOD PRESSURE: 119 MMHG | DIASTOLIC BLOOD PRESSURE: 81 MMHG | TEMPERATURE: 98 F | HEIGHT: 64 IN

## 2025-05-12 PROCEDURE — 96374 THER/PROPH/DIAG INJ IV PUSH: CPT

## 2025-05-12 PROCEDURE — 96375 TX/PRO/DX INJ NEW DRUG ADDON: CPT

## 2025-05-12 PROCEDURE — 99284 EMERGENCY DEPT VISIT MOD MDM: CPT | Mod: 25

## 2025-05-12 PROCEDURE — 99284 EMERGENCY DEPT VISIT MOD MDM: CPT

## 2025-05-12 RX ORDER — DIPHENHYDRAMINE HCL 12.5MG/5ML
25 ELIXIR ORAL ONCE
Refills: 0 | Status: COMPLETED | OUTPATIENT
Start: 2025-05-12 | End: 2025-05-12

## 2025-05-12 RX ORDER — KETOROLAC TROMETHAMINE 30 MG/ML
15 INJECTION, SOLUTION INTRAMUSCULAR; INTRAVENOUS ONCE
Refills: 0 | Status: DISCONTINUED | OUTPATIENT
Start: 2025-05-12 | End: 2025-05-12

## 2025-05-12 RX ORDER — METOCLOPRAMIDE HCL 10 MG
10 TABLET ORAL ONCE
Refills: 0 | Status: COMPLETED | OUTPATIENT
Start: 2025-05-12 | End: 2025-05-12

## 2025-05-12 RX ADMIN — KETOROLAC TROMETHAMINE 15 MILLIGRAM(S): 30 INJECTION, SOLUTION INTRAMUSCULAR; INTRAVENOUS at 07:40

## 2025-05-12 RX ADMIN — Medication 104 MILLIGRAM(S): at 07:39

## 2025-05-12 RX ADMIN — Medication 25 MILLIGRAM(S): at 07:40

## 2025-05-12 RX ADMIN — Medication 1000 MILLILITER(S): at 07:39

## 2025-05-12 NOTE — ED PROVIDER NOTE - CLINICAL SUMMARY MEDICAL DECISION MAKING FREE TEXT BOX
patient with headache and neck pain that is typical of her migraine symptoms.  Will do migraine cocktail reassess.    No red flags or concerning symptoms

## 2025-05-12 NOTE — ED ADULT NURSE REASSESSMENT NOTE - NS ED NURSE REASSESS COMMENT FT1
Received patient from night RN awaiting orders for med , oncoming MD placed ordered , IV access obtained , IV fluids started and meds administered,

## 2025-05-12 NOTE — ED PROVIDER NOTE - OBJECTIVE STATEMENT
50-year-old female history of occipital migraines presents with exacerbation of migraine since 4 AM today.  She took acetaminophen without improvement.  She gets these headaches every 3 days or so.  She used to follow with a neurologist and had MRIs and everything last 2 years ago.  Patient not had any follow-up since 2 years ago.

## 2025-05-12 NOTE — ED ADULT NURSE NOTE - OBJECTIVE STATEMENT
patient endorsing waking up from severe headache, N, and 4 episodes of vomiting x 1 hour ago. son stated "usually when her BP is high she gets the same s/s, and its happening more frequently for the past months." patient denies diarrhea, SOB and chest pain.

## 2025-05-12 NOTE — ED PROVIDER NOTE - PHYSICAL EXAMINATION
Supple neck no rash or petechiae right lateral neck tenderness palpation.  Awake alert extraocular wounds intact speech clear NIH stroke scale is 0

## 2025-05-12 NOTE — ED ADULT NURSE NOTE - CHIEF COMPLAINT QUOTE
Pt BIBA c/o headache as per son pt has history of migraine headache c/o "pain from the back of her head to the front of her head' as per son pt stated this is how she feels when she has a migraine head and the light is bothering her eyes with nausea

## 2025-05-12 NOTE — ED PROVIDER NOTE - PATIENT PORTAL LINK FT
You can access the FollowMyHealth Patient Portal offered by [facility] by registering at the following website: [email or web address]. By joining [facility]’s FollowMyHealth portal, you will also be able to view your health information using other applications (apps) compatible with our system.

## 2025-05-12 NOTE — ED ADULT TRIAGE NOTE - CHIEF COMPLAINT QUOTE
Pt BIBA c/o headache as per son pt has history of migraine headache c/o "pain from the back of her head to the front of her head' as per son pt stated this is how she feels when she has a migraine head and the light is bothering her eyes with nausea Pt BIBA c/o headache as per son pt has history of migraine headache c/o "pain from the back of her head to the front of her head' as per son pt stated this is how she feels when she has a migraine headache and the light is bothering her eyes with nausea

## 2025-07-22 NOTE — ED PROVIDER NOTE - NS_ATTENDINGSCRIBE_ED_ALL_ED
Application Tool (Optional): Liquid Nitrogen Sprayer Render Note In Bullet Format When Appropriate: No Show Applicator Variable?: Yes Aperture Size (Optional): C Consent: The patient's consent was obtained including but not limited to risks of crusting, scabbing, blistering, scarring, darker or lighter pigmentary change, recurrence, incomplete removal and infection. Number Of Freeze-Thaw Cycles: 1 freeze-thaw cycle Detail Level: Detailed Duration Of Freeze Thaw-Cycle (Seconds): 5 Post-Care Instructions: I reviewed with the patient in detail post-care instructions. Patient is to wear sunprotection, and avoid picking at any of the treated lesions. Pt may apply Vaseline to crusted or scabbing areas. I personally performed the service described in the documentation recorded by the scribe in my presence, and it accurately and completely records my words and actions.